# Patient Record
Sex: FEMALE | Race: WHITE
[De-identification: names, ages, dates, MRNs, and addresses within clinical notes are randomized per-mention and may not be internally consistent; named-entity substitution may affect disease eponyms.]

---

## 2018-02-18 PROBLEM — Z00.00 ENCOUNTER FOR PREVENTIVE HEALTH EXAMINATION: Status: ACTIVE | Noted: 2018-02-18

## 2018-02-20 ENCOUNTER — APPOINTMENT (OUTPATIENT)
Dept: CARDIOLOGY | Facility: CLINIC | Age: 73
End: 2018-02-20
Payer: MEDICARE

## 2018-02-20 ENCOUNTER — NON-APPOINTMENT (OUTPATIENT)
Age: 73
End: 2018-02-20

## 2018-02-20 VITALS
DIASTOLIC BLOOD PRESSURE: 70 MMHG | HEART RATE: 74 BPM | WEIGHT: 224 LBS | HEIGHT: 61 IN | BODY MASS INDEX: 42.29 KG/M2 | SYSTOLIC BLOOD PRESSURE: 130 MMHG

## 2018-02-20 DIAGNOSIS — Z80.9 FAMILY HISTORY OF MALIGNANT NEOPLASM, UNSPECIFIED: ICD-10-CM

## 2018-02-20 DIAGNOSIS — Z86.79 PERSONAL HISTORY OF OTHER DISEASES OF THE CIRCULATORY SYSTEM: ICD-10-CM

## 2018-02-20 DIAGNOSIS — Z87.898 PERSONAL HISTORY OF OTHER SPECIFIED CONDITIONS: ICD-10-CM

## 2018-02-20 DIAGNOSIS — Z82.5 FAMILY HISTORY OF ASTHMA AND OTHER CHRONIC LOWER RESPIRATORY DISEASES: ICD-10-CM

## 2018-02-20 DIAGNOSIS — Z82.49 FAMILY HISTORY OF ISCHEMIC HEART DISEASE AND OTHER DISEASES OF THE CIRCULATORY SYSTEM: ICD-10-CM

## 2018-02-20 DIAGNOSIS — Z84.89 FAMILY HISTORY OF OTHER SPECIFIED CONDITIONS: ICD-10-CM

## 2018-02-20 DIAGNOSIS — Z86.39 PERSONAL HISTORY OF OTHER ENDOCRINE, NUTRITIONAL AND METABOLIC DISEASE: ICD-10-CM

## 2018-02-20 PROCEDURE — 93000 ELECTROCARDIOGRAM COMPLETE: CPT

## 2018-02-20 PROCEDURE — 99204 OFFICE O/P NEW MOD 45 MIN: CPT

## 2018-02-20 RX ORDER — AMLODIPINE BESYLATE 5 MG/1
5 TABLET ORAL DAILY
Refills: 0 | Status: ACTIVE | COMMUNITY

## 2018-02-20 RX ORDER — METOPROLOL SUCCINATE 100 MG/1
100 TABLET, EXTENDED RELEASE ORAL DAILY
Refills: 0 | Status: ACTIVE | COMMUNITY

## 2018-02-20 RX ORDER — MULTIVITAMIN
TABLET ORAL
Refills: 0 | Status: ACTIVE | COMMUNITY

## 2018-02-20 RX ORDER — VALSARTAN AND HYDROCHLOROTHIAZIDE 160; 12.5 MG/1; MG/1
160-12.5 TABLET, FILM COATED ORAL DAILY
Refills: 0 | Status: ACTIVE | COMMUNITY

## 2018-02-21 ENCOUNTER — APPOINTMENT (OUTPATIENT)
Dept: CARDIOLOGY | Facility: CLINIC | Age: 73
End: 2018-02-21
Payer: MEDICARE

## 2018-02-21 PROCEDURE — 93306 TTE W/DOPPLER COMPLETE: CPT

## 2018-02-23 PROCEDURE — 93224 XTRNL ECG REC UP TO 48 HRS: CPT

## 2018-03-08 ENCOUNTER — APPOINTMENT (OUTPATIENT)
Dept: CARDIOLOGY | Facility: CLINIC | Age: 73
End: 2018-03-08
Payer: MEDICARE

## 2018-03-08 PROCEDURE — 93351 STRESS TTE COMPLETE: CPT

## 2018-03-12 ENCOUNTER — APPOINTMENT (OUTPATIENT)
Dept: CARDIOLOGY | Facility: CLINIC | Age: 73
End: 2018-03-12
Payer: MEDICARE

## 2018-03-12 VITALS
DIASTOLIC BLOOD PRESSURE: 62 MMHG | WEIGHT: 225 LBS | HEART RATE: 57 BPM | HEIGHT: 61 IN | SYSTOLIC BLOOD PRESSURE: 120 MMHG | BODY MASS INDEX: 42.48 KG/M2

## 2018-03-12 PROCEDURE — 99214 OFFICE O/P EST MOD 30 MIN: CPT

## 2018-04-04 ENCOUNTER — APPOINTMENT (OUTPATIENT)
Dept: CARDIOLOGY | Facility: CLINIC | Age: 73
End: 2018-04-04
Payer: MEDICARE

## 2018-04-04 PROCEDURE — A9502: CPT

## 2018-04-04 PROCEDURE — 93015 CV STRESS TEST SUPVJ I&R: CPT

## 2018-04-04 PROCEDURE — 78452 HT MUSCLE IMAGE SPECT MULT: CPT

## 2018-04-11 ENCOUNTER — RX RENEWAL (OUTPATIENT)
Age: 73
End: 2018-04-11

## 2018-04-17 ENCOUNTER — APPOINTMENT (OUTPATIENT)
Dept: CARDIOLOGY | Facility: CLINIC | Age: 73
End: 2018-04-17
Payer: MEDICARE

## 2018-04-17 VITALS
WEIGHT: 224 LBS | SYSTOLIC BLOOD PRESSURE: 118 MMHG | OXYGEN SATURATION: 98 % | HEART RATE: 54 BPM | DIASTOLIC BLOOD PRESSURE: 60 MMHG | HEIGHT: 61 IN | BODY MASS INDEX: 42.29 KG/M2 | RESPIRATION RATE: 16 BRPM

## 2018-04-17 PROCEDURE — 99214 OFFICE O/P EST MOD 30 MIN: CPT

## 2018-10-12 ENCOUNTER — APPOINTMENT (OUTPATIENT)
Dept: CARDIOLOGY | Facility: CLINIC | Age: 73
End: 2018-10-12
Payer: MEDICARE

## 2018-10-12 VITALS — OXYGEN SATURATION: 100 %

## 2018-10-12 VITALS
WEIGHT: 226 LBS | SYSTOLIC BLOOD PRESSURE: 122 MMHG | DIASTOLIC BLOOD PRESSURE: 64 MMHG | HEART RATE: 64 BPM | BODY MASS INDEX: 41.59 KG/M2 | HEIGHT: 62 IN

## 2018-10-12 PROCEDURE — 99214 OFFICE O/P EST MOD 30 MIN: CPT

## 2019-04-09 ENCOUNTER — APPOINTMENT (OUTPATIENT)
Dept: CARDIOLOGY | Facility: CLINIC | Age: 74
End: 2019-04-09
Payer: MEDICARE

## 2019-04-09 PROCEDURE — 93880 EXTRACRANIAL BILAT STUDY: CPT

## 2019-04-16 ENCOUNTER — APPOINTMENT (OUTPATIENT)
Dept: CARDIOLOGY | Facility: CLINIC | Age: 74
End: 2019-04-16
Payer: MEDICARE

## 2019-04-16 ENCOUNTER — NON-APPOINTMENT (OUTPATIENT)
Age: 74
End: 2019-04-16

## 2019-04-16 VITALS
DIASTOLIC BLOOD PRESSURE: 64 MMHG | HEIGHT: 60 IN | HEART RATE: 64 BPM | WEIGHT: 210 LBS | BODY MASS INDEX: 41.23 KG/M2 | SYSTOLIC BLOOD PRESSURE: 112 MMHG

## 2019-04-16 PROCEDURE — 93000 ELECTROCARDIOGRAM COMPLETE: CPT

## 2019-04-16 PROCEDURE — 99214 OFFICE O/P EST MOD 30 MIN: CPT

## 2019-04-16 RX ORDER — ASPIRIN 325 MG/1
325 TABLET, FILM COATED ORAL
Refills: 0 | Status: DISCONTINUED | COMMUNITY
End: 2019-04-16

## 2019-04-16 RX ORDER — SULFAMETHOXAZOLE AND TRIMETHOPRIM 800; 160 MG/1; MG/1
800-160 TABLET ORAL
Qty: 10 | Refills: 0 | Status: DISCONTINUED | COMMUNITY
Start: 2019-02-22 | End: 2019-04-16

## 2019-04-16 RX ORDER — ASPIRIN ENTERIC COATED TABLETS 81 MG 81 MG/1
81 TABLET, DELAYED RELEASE ORAL
Refills: 0 | Status: ACTIVE | COMMUNITY
Start: 2019-04-16

## 2019-04-16 NOTE — REVIEW OF SYSTEMS
[see HPI] : see HPI [Negative] : Heme/Lymph [Shortness Of Breath] : no shortness of breath [Chest  Pressure] : no chest pressure [Dyspnea on exertion] : not dyspnea during exertion [Chest Pain] : no chest pain [Lower Ext Edema] : no extremity edema [Leg Claudication] : no intermittent leg claudication [Palpitations] : no palpitations [Dizziness] : no dizziness

## 2019-04-16 NOTE — PHYSICAL EXAM
[Normal Appearance] : normal appearance [Well Groomed] : well groomed [No Deformities] : no deformities [Auscultation Breath Sounds / Voice Sounds] : lungs were clear to auscultation bilaterally [Respiration, Rhythm And Depth] : normal respiratory rhythm and effort [Heart Rate And Rhythm] : heart rate and rhythm were normal [Heart Sounds] : normal S1 and S2 [Arterial Pulses Normal] : the arterial pulses were normal [Edema] : no peripheral edema present [Bowel Sounds] : normal bowel sounds [Abdomen Soft] : soft [Abdomen Tenderness] : non-tender [Abnormal Walk] : normal gait [Gait - Sufficient For Exercise Testing] : the gait was sufficient for exercise testing [Cyanosis, Localized] : no localized cyanosis [Skin Color & Pigmentation] : normal skin color and pigmentation [Skin Turgor] : normal skin turgor [] : no rash [Oriented To Time, Place, And Person] : oriented to person, place, and time [Impaired Insight] : insight and judgment were intact [Affect] : the affect was normal [Mood] : the mood was normal [No Anxiety] : not feeling anxious [FreeTextEntry1] : soft nestor

## 2019-08-14 ENCOUNTER — MEDICATION RENEWAL (OUTPATIENT)
Age: 74
End: 2019-08-14

## 2019-10-31 ENCOUNTER — APPOINTMENT (OUTPATIENT)
Dept: CARDIOLOGY | Facility: CLINIC | Age: 74
End: 2019-10-31
Payer: MEDICARE

## 2019-10-31 VITALS
SYSTOLIC BLOOD PRESSURE: 132 MMHG | BODY MASS INDEX: 42.21 KG/M2 | WEIGHT: 215 LBS | HEIGHT: 60 IN | HEART RATE: 62 BPM | OXYGEN SATURATION: 97 % | DIASTOLIC BLOOD PRESSURE: 66 MMHG

## 2019-10-31 PROCEDURE — 99214 OFFICE O/P EST MOD 30 MIN: CPT

## 2019-11-01 NOTE — PHYSICAL EXAM
[Normal Appearance] : normal appearance [Well Groomed] : well groomed [No Deformities] : no deformities [] : no respiratory distress [Respiration, Rhythm And Depth] : normal respiratory rhythm and effort [Auscultation Breath Sounds / Voice Sounds] : lungs were clear to auscultation bilaterally [Heart Rate And Rhythm] : heart rate and rhythm were normal [Heart Sounds] : normal S1 and S2 [Arterial Pulses Normal] : the arterial pulses were normal [Edema] : no peripheral edema present [Bowel Sounds] : normal bowel sounds [Abdomen Soft] : soft [Abdomen Tenderness] : non-tender [Abnormal Walk] : normal gait [Gait - Sufficient For Exercise Testing] : the gait was sufficient for exercise testing [Cyanosis, Localized] : no localized cyanosis [Skin Color & Pigmentation] : normal skin color and pigmentation [Oriented To Time, Place, And Person] : oriented to person, place, and time [Impaired Insight] : insight and judgment were intact [Affect] : the affect was normal [Mood] : the mood was normal [No Anxiety] : not feeling anxious [Normal Conjunctiva] : the conjunctiva exhibited no abnormalities [FreeTextEntry1] : no carotid bruit, no JVD

## 2019-11-01 NOTE — ASSESSMENT
[FreeTextEntry1] :  Assessment and Plan:\par Huma Aquino is a 72 year old female with above history seen today Oct 31, 2019 with the following active issues. \par \par -CAD risk factors age, HTN, dyslipidemia, strong family h/o CAD, overweight and abnormal ekg. recent Echocardiogram demonstrates normal resting cardiac structure and function. Recent Stress echocardiogram was nondiagnostic due to submaximal heart rate.  \par Patient is asymptomatic from an arrhythmia standpoint.\par \par -irregular heart beat for years. Avoid stimulants and OTC decongestants. Holter monitor reviewed. No sustained arrhythmias. If symptoms increase in frequency, duration or intensity consider event monitor. \par \par - Hyperlipidemia, Tolerating Crestor 5 mg. Labs as above. Patient should follow a low trans sat fat diet. \par Suffering for myalgias. She will have a drug holiday for the next 3 weeks and call me to report how she is feeling. \par \par -overweight. Diet, weight loss should be followed. \par \par Red flag symptoms which would warrant sooner emergent evaluation reviewed with the patient. \par Questions and concerns were addressed and answered.\par \par Sincerely,\par \par ANTHONY Jasso \par \par

## 2019-11-01 NOTE — REVIEW OF SYSTEMS
[see HPI] : see HPI [Negative] : Heme/Lymph [Shortness Of Breath] : no shortness of breath [Dyspnea on exertion] : not dyspnea during exertion [Chest  Pressure] : no chest pressure [Chest Pain] : no chest pain [Lower Ext Edema] : no extremity edema [Leg Claudication] : no intermittent leg claudication [Palpitations] : no palpitations [Dizziness] : no dizziness

## 2019-11-01 NOTE — REASON FOR VISIT
[Follow-Up - Clinic] : a clinic follow-up of [Abnormal ECG] : an abnormal ECG [Hyperlipidemia] : hyperlipidemia [Medication Management] : Medication management [Palpitations] : palpitations [FreeTextEntry1] : Mrs. Aquino is a 72 year old female that presents today Oct. 31, 2019. Patient was last seen in our office on Apr. 16th, 2019. Over the winter she suffered from shingles, influenza and a GI infection. \par She has remained asymptomatic from a cardiovascular standpoint. \par Denies change in her current medication regimen.  Improvement in palpitations and irregularity in her heart beat. 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 \par Denies heart racing, palpitations, or skipped beats.\par Denies unusual shortness of breath, orthopnea, or Dyspnea on mild exertion\par Denies weight gain or lower leg edema. \par Denies palpitations, lightheadedness, dizziness, disorientation,  syncope.  \par Denies any unusual bleeding or black/tarry stool.\par \par Lipid panel reviewed: , TG 88, LDL 44, HLD 46, \par \par \par Cardiovascular history is significant for hypertension, overweight, abnormal ekg, age and family h/o CAD (multiple family members). \par \par She denies any h/o MI, CAD, CHF, CVA, TIA or arrhythmia. \par \par Carotid duplex 4/9/19 non-obstructive disease\par \par Abd CT 10/19 no aneurysm of the abdominal aorta\par Myocardial perfusion imaging 4/4/18 performed with Lexiscan, negative for ischemia or infarct\par \par \par \par Echocardiogram February 21, 2018, ejection fraction 60%, mild mitral regurgitation, no segmental wall motion abnormalities.\par \par Stress echocardiogram March 8, 2018 patient completed 4 minutes 30 seconds of the protocol.  Submaximal rate noted.  Peak blood pressure 130/60. achieved 71% of MPHR\par \par \par

## 2019-11-04 ENCOUNTER — TRANSCRIPTION ENCOUNTER (OUTPATIENT)
Age: 74
End: 2019-11-04

## 2020-08-08 ENCOUNTER — RX RENEWAL (OUTPATIENT)
Age: 75
End: 2020-08-08

## 2020-08-10 ENCOUNTER — NON-APPOINTMENT (OUTPATIENT)
Age: 75
End: 2020-08-10

## 2020-08-10 ENCOUNTER — RX RENEWAL (OUTPATIENT)
Age: 75
End: 2020-08-10

## 2020-08-10 ENCOUNTER — APPOINTMENT (OUTPATIENT)
Dept: CARDIOLOGY | Facility: CLINIC | Age: 75
End: 2020-08-10
Payer: MEDICARE

## 2020-08-10 VITALS
HEART RATE: 67 BPM | TEMPERATURE: 96.8 F | DIASTOLIC BLOOD PRESSURE: 62 MMHG | OXYGEN SATURATION: 99 % | HEIGHT: 60 IN | BODY MASS INDEX: 43 KG/M2 | SYSTOLIC BLOOD PRESSURE: 120 MMHG | WEIGHT: 219 LBS

## 2020-08-10 PROCEDURE — 93000 ELECTROCARDIOGRAM COMPLETE: CPT

## 2020-08-10 NOTE — PHYSICAL EXAM
[Normal Appearance] : normal appearance [Well Groomed] : well groomed [No Deformities] : no deformities [Respiration, Rhythm And Depth] : normal respiratory rhythm and effort [Auscultation Breath Sounds / Voice Sounds] : lungs were clear to auscultation bilaterally [Heart Rate And Rhythm] : heart rate and rhythm were normal [Heart Sounds] : normal S1 and S2 [Arterial Pulses Normal] : the arterial pulses were normal [FreeTextEntry1] : soft nestor [Edema] : no peripheral edema present [Bowel Sounds] : normal bowel sounds [Abdomen Soft] : soft [Abnormal Walk] : normal gait [Abdomen Tenderness] : non-tender [Cyanosis, Localized] : no localized cyanosis [Gait - Sufficient For Exercise Testing] : the gait was sufficient for exercise testing [Skin Turgor] : normal skin turgor [Skin Color & Pigmentation] : normal skin color and pigmentation [Oriented To Time, Place, And Person] : oriented to person, place, and time [] : no rash [Impaired Insight] : insight and judgment were intact [Affect] : the affect was normal [Mood] : the mood was normal [No Anxiety] : not feeling anxious

## 2020-08-10 NOTE — ASSESSMENT
[FreeTextEntry1] : COREY DON  is a 74 year F  who presents today Aug 10, 2020 with the above history and the following active issues. \par \par -CAD risk factors age, HTN, dyslipidemia, strong family h/o CAD, overweight and abnormal ekg. Echocardiogram demonstrates normal resting cardiac structure and function.  Stress echocardiogram was non-diagnostic due to submaximal heart rate.  Myocardial perfusion imaging negative for ischemia or infarct. \par Limitations of non-invasive testing reviewed. \par Patient is asymptomatic from an arrhythmia standpoint.\par \par -irregular heart beat for years. Avoid stimulants. Holter monitor reviewed. No sustained arrhythmias. If symptoms increase in frequency, duration or intensity consider event monitor. Labs as above.\par \par - Hyperlipidemia, tolerating Crestor 5 mg. Patient should follow a low trans sat fat diet. \par Excellent cholesterol panel. Lifestyle and risk factor modification.\par \par -overweight. Diet, weight loss should be followed. \par \par Red flag symptoms which would warrant sooner emergent evaluation reviewed with the patient. \par Questions and concerns were addressed and answered.\par \par Sincerely,\par \par Amy Blount PA-C\par Patients history, testing and plan reviewed with supervising MD: Dr. Yasir Arreaga

## 2020-08-10 NOTE — REASON FOR VISIT
[FreeTextEntry1] : COREY DON  is a 74 year F  who presents today Aug 10, 2020 in clinical follow-up. \par Overall she is feeling well. No recent illness or hospital stay. Less active during the pandemic with the gym being closed. \par She has remained asymptomatic from a cardiovascular standpoint. \par Denies change in her current medication regimen. \par \par Today she denies chest pain, pressure, unusual shortness of breath, lightheadedness, dizziness, near syncope or syncope.\par \par Cardiovascular history is significant for hypertension, overweight, abnormal ekg, age and family h/o CAD (multiple family members). \par \par She denies any h/o MI, CAD, CHF, CVA, TIA or arrhythmia. \par \par Carotid duplex 4/9/19 non-obstructive disease\par \par Abd CT 10/19 no aneurysm of the abdominal aorta\par Myocardial perfusion imaging 4/4/18 performed with Lexiscan, negative for ischemia or infarct\par \par lab work 3/10/18 Alkaline phosphatase 65, ALT 9, AST 18, triglycerides 92, HDL 43, LDL 42, total cholesterol 103, CK 44.\par \par Echocardiogram February 21, 2018, ejection fraction 60%, mild mitral regurgitation, no segmental wall motion abnormalities.\par \par Stress echocardiogram March 8, 2018 patient completed 4 minutes 30 seconds of the protocol.  Submaximal rate noted.  Peak blood pressure 130/60. achieved 71% of MPHR\par \par 24hr Holter monitor - no sustained arrhythmias, rare PVC's, min HR 49bpm, max HR 85bpm, average HR 58bpm\par \par Labs:\par 11-2-17 wbc 7.1, h/h 13.0/39.54, plat 294, NA+ 142, K 3.8, bun/crea t22/0.85, AST 19, ALT 13, glucose 115, hgba1c 5.8, globulin 4.1, LDL 97, triglycerides 99, HDL 48, TSH 1.30

## 2020-08-10 NOTE — REVIEW OF SYSTEMS
[see HPI] : see HPI [Shortness Of Breath] : no shortness of breath [Dyspnea on exertion] : not dyspnea during exertion [Chest  Pressure] : no chest pressure [Chest Pain] : no chest pain [Lower Ext Edema] : no extremity edema [Leg Claudication] : no intermittent leg claudication [Palpitations] : no palpitations [Dizziness] : no dizziness [Negative] : Heme/Lymph

## 2020-11-09 ENCOUNTER — RX RENEWAL (OUTPATIENT)
Age: 75
End: 2020-11-09

## 2021-02-04 RX ORDER — ROSUVASTATIN CALCIUM 5 MG/1
5 TABLET, FILM COATED ORAL
Qty: 90 | Refills: 0 | Status: ACTIVE | COMMUNITY
Start: 2018-02-20 | End: 1900-01-01

## 2021-08-23 ENCOUNTER — NON-APPOINTMENT (OUTPATIENT)
Age: 76
End: 2021-08-23

## 2021-08-23 ENCOUNTER — APPOINTMENT (OUTPATIENT)
Dept: CARDIOLOGY | Facility: CLINIC | Age: 76
End: 2021-08-23
Payer: MEDICARE

## 2021-08-23 VITALS
HEIGHT: 60 IN | BODY MASS INDEX: 41.43 KG/M2 | HEART RATE: 66 BPM | TEMPERATURE: 97 F | OXYGEN SATURATION: 98 % | SYSTOLIC BLOOD PRESSURE: 126 MMHG | WEIGHT: 211 LBS | DIASTOLIC BLOOD PRESSURE: 60 MMHG

## 2021-08-23 PROCEDURE — 93000 ELECTROCARDIOGRAM COMPLETE: CPT

## 2021-08-23 PROCEDURE — 99214 OFFICE O/P EST MOD 30 MIN: CPT

## 2021-08-23 NOTE — PHYSICAL EXAM
[Well Developed] : well developed [Well Nourished] : well nourished [No Acute Distress] : no acute distress [Normal Conjunctiva] : normal conjunctiva [Normal Venous Pressure] : normal venous pressure [No Carotid Bruit] : no carotid bruit [Normal S1, S2] : normal S1, S2 [No Murmur] : no murmur [No Rub] : no rub [No Gallop] : no gallop [Clear Lung Fields] : clear lung fields [Good Air Entry] : good air entry [No Respiratory Distress] : no respiratory distress  [Soft] : abdomen soft [Non Tender] : non-tender [No Masses/organomegaly] : no masses/organomegaly [Normal Bowel Sounds] : normal bowel sounds [Normal Gait] : normal gait [No Edema] : no edema [No Cyanosis] : no cyanosis [No Clubbing] : no clubbing [No Varicosities] : no varicosities [No Rash] : no rash [No Skin Lesions] : no skin lesions [Moves all extremities] : moves all extremities [No Focal Deficits] : no focal deficits [Normal Speech] : normal speech [Normal memory] : normal memory [Alert and Oriented] : alert and oriented [Normal Appearance] : normal appearance [Well Groomed] : well groomed [No Deformities] : no deformities [Respiration, Rhythm And Depth] : normal respiratory rhythm and effort [Auscultation Breath Sounds / Voice Sounds] : lungs were clear to auscultation bilaterally [Heart Rate And Rhythm] : heart rate and rhythm were normal [Heart Sounds] : normal S1 and S2 [Arterial Pulses Normal] : the arterial pulses were normal [Edema] : no peripheral edema present [Bowel Sounds] : normal bowel sounds [Abdomen Soft] : soft [Abnormal Walk] : normal gait [Abdomen Tenderness] : non-tender [Gait - Sufficient For Exercise Testing] : the gait was sufficient for exercise testing [Cyanosis, Localized] : no localized cyanosis [Skin Color & Pigmentation] : normal skin color and pigmentation [Skin Turgor] : normal skin turgor [] : no rash [Impaired Insight] : insight and judgment were intact [Oriented To Time, Place, And Person] : oriented to person, place, and time [Affect] : the affect was normal [Mood] : the mood was normal [No Anxiety] : not feeling anxious [FreeTextEntry1] : soft nestor

## 2021-08-23 NOTE — REASON FOR VISIT
[Symptom and Test Evaluation] : symptom and test evaluation [FreeTextEntry1] : COREY DON  is a 74 year F  who presents today  in clinical follow-up. \par Overall she is feeling well. No recent illness or hospital stay. Less active during the pandemic with the gym being closed. \par She has remained asymptomatic from a cardiovascular standpoint. \par Denies change in her current medication regimen. \par \par Today she denies chest pain, pressure, unusual shortness of breath, lightheadedness, dizziness, near syncope or syncope.\par \par Cardiovascular history is significant for hypertension, overweight, abnormal ekg, age and family h/o CAD (multiple family members). \par \par She denies any h/o MI, CAD, CHF, CVA, TIA or arrhythmia. \par \par Carotid duplex 4/9/19 non-obstructive disease\par \par Abd CT 10/19 no aneurysm of the abdominal aorta\par Myocardial perfusion imaging 4/4/18 performed with Lexiscan, negative for ischemia or infarct\par \par lab work 3/10/18 Alkaline phosphatase 65, ALT 9, AST 18, triglycerides 92, HDL 43, LDL 42, total cholesterol 103, CK 44.\par \par Echocardiogram February 21, 2018, ejection fraction 60%, mild mitral regurgitation, no segmental wall motion abnormalities.\par \par Stress echocardiogram March 8, 2018 patient completed 4 minutes 30 seconds of the protocol.  Submaximal rate noted.  Peak blood pressure 130/60. achieved 71% of MPHR\par \par 24hr Holter monitor - no sustained arrhythmias, rare PVC's, min HR 49bpm, max HR 85bpm, average HR 58bpm\par \par Labs:\par 11-2-17 wbc 7.1, h/h 13.0/39.54, plat 294, NA+ 142, K 3.8, bun/crea t22/0.85, AST 19, ALT 13, glucose 115, hgba1c 5.8, globulin 4.1, LDL 97, triglycerides 99, HDL 48, TSH 1.30

## 2021-08-23 NOTE — PHYSICAL EXAM
[Well Developed] : well developed [Well Nourished] : well nourished [No Acute Distress] : no acute distress [Normal Conjunctiva] : normal conjunctiva [Normal Venous Pressure] : normal venous pressure [No Carotid Bruit] : no carotid bruit [Normal S1, S2] : normal S1, S2 [No Murmur] : no murmur [No Rub] : no rub [No Gallop] : no gallop [Clear Lung Fields] : clear lung fields [Good Air Entry] : good air entry [No Respiratory Distress] : no respiratory distress  [Soft] : abdomen soft [Non Tender] : non-tender [No Masses/organomegaly] : no masses/organomegaly [Normal Bowel Sounds] : normal bowel sounds [Normal Gait] : normal gait [No Edema] : no edema [No Cyanosis] : no cyanosis [No Clubbing] : no clubbing [No Varicosities] : no varicosities [No Rash] : no rash [No Skin Lesions] : no skin lesions [Moves all extremities] : moves all extremities [No Focal Deficits] : no focal deficits [Normal Speech] : normal speech [Normal memory] : normal memory [Alert and Oriented] : alert and oriented [Normal Appearance] : normal appearance [Well Groomed] : well groomed [No Deformities] : no deformities [Respiration, Rhythm And Depth] : normal respiratory rhythm and effort [Auscultation Breath Sounds / Voice Sounds] : lungs were clear to auscultation bilaterally [Heart Rate And Rhythm] : heart rate and rhythm were normal [Heart Sounds] : normal S1 and S2 [Arterial Pulses Normal] : the arterial pulses were normal [Edema] : no peripheral edema present [Bowel Sounds] : normal bowel sounds [Abdomen Soft] : soft [Abdomen Tenderness] : non-tender [Abnormal Walk] : normal gait [Gait - Sufficient For Exercise Testing] : the gait was sufficient for exercise testing [Cyanosis, Localized] : no localized cyanosis [Skin Color & Pigmentation] : normal skin color and pigmentation [Skin Turgor] : normal skin turgor [] : no rash [Oriented To Time, Place, And Person] : oriented to person, place, and time [Impaired Insight] : insight and judgment were intact [Affect] : the affect was normal [Mood] : the mood was normal [No Anxiety] : not feeling anxious [FreeTextEntry1] : soft nestor

## 2021-08-23 NOTE — ASSESSMENT
[FreeTextEntry1] : COREY DON  is a 74 year F  who presents today with the above history and the following active issues. \par \par -CAD risk factors age, HTN, dyslipidemia, strong family h/o CAD, overweight and abnormal ekg. Echocardiogram demonstrates normal resting cardiac structure and function.  Stress echocardiogram was non-diagnostic due to submaximal heart rate.  Myocardial perfusion imaging negative for ischemia or infarct. \par Limitations of non-invasive testing reviewed. \par Patient is asymptomatic from an arrhythmia standpoint.\par \par -irregular heart beat for years. Avoid stimulants. Holter monitor reviewed. No sustained arrhythmias. If symptoms increase in frequency, duration or intensity consider event monitor. Labs as above.\par \par - Hyperlipidemia, tolerating Crestor 5 mg. Patient should follow a low trans sat fat diet. \par Excellent cholesterol panel. Lifestyle and risk factor modification.\par \par -overweight. Diet, weight loss should be followed. \par \par Red flag symptoms which would warrant sooner emergent evaluation reviewed with the patient. \par Questions and concerns were addressed and answered.\par \par Sincerely,\par \par Amy Blount PA-C\par Patients history, testing and plan reviewed with supervising MD: Dr. Yasir Arreaga

## 2022-08-23 ENCOUNTER — NON-APPOINTMENT (OUTPATIENT)
Age: 77
End: 2022-08-23

## 2022-08-23 ENCOUNTER — APPOINTMENT (OUTPATIENT)
Dept: CARDIOLOGY | Facility: CLINIC | Age: 77
End: 2022-08-23

## 2022-08-23 VITALS
WEIGHT: 200 LBS | DIASTOLIC BLOOD PRESSURE: 62 MMHG | HEIGHT: 60 IN | SYSTOLIC BLOOD PRESSURE: 136 MMHG | BODY MASS INDEX: 39.27 KG/M2 | TEMPERATURE: 97.3 F | HEART RATE: 67 BPM | OXYGEN SATURATION: 98 %

## 2022-08-23 PROCEDURE — 93000 ELECTROCARDIOGRAM COMPLETE: CPT

## 2022-08-23 PROCEDURE — 99214 OFFICE O/P EST MOD 30 MIN: CPT

## 2022-08-23 RX ORDER — ANASTROZOLE 1 MG/1
1 TABLET ORAL
Refills: 0 | Status: DISCONTINUED | COMMUNITY
End: 2022-08-23

## 2022-08-23 NOTE — REASON FOR VISIT
[FreeTextEntry1] : COREY DON  is a 74 year F  who presents today in clinical follow-up. \par Overall she is feeling well. No recent illness or hospital stay. Less active during the pandemic with the gym being closed. \par She has remained asymptomatic from a cardiovascular standpoint. \par Denies change in her current medication regimen. \par \par Today she denies chest pain, pressure, unusual shortness of breath, lightheadedness, dizziness, near syncope or syncope.\par \par Cardiovascular history is significant for hypertension, overweight, abnormal ekg, age and family h/o CAD (multiple family members). \par \par She denies any h/o MI, CAD, CHF, CVA, TIA or arrhythmia. \par \par Carotid duplex 4/9/19 non-obstructive disease\par \par Abd CT 10/19 no aneurysm of the abdominal aorta\par Myocardial perfusion imaging 4/4/18 performed with Lexiscan, negative for ischemia or infarct\par \par lab work 3/10/18 Alkaline phosphatase 65, ALT 9, AST 18, triglycerides 92, HDL 43, LDL 42, total cholesterol 103, CK 44.\par \par Echocardiogram February 21, 2018, ejection fraction 60%, mild mitral regurgitation, no segmental wall motion abnormalities.\par \par Stress echocardiogram March 8, 2018 patient completed 4 minutes 30 seconds of the protocol.  Submaximal rate noted.  Peak blood pressure 130/60. achieved 71% of MPHR\par \par 24hr Holter monitor - no sustained arrhythmias, rare PVC's, min HR 49bpm, max HR 85bpm, average HR 58bpm\par \par Labs:\par 11-2-17 wbc 7.1, h/h 13.0/39.54, plat 294, NA+ 142, K 3.8, bun/crea t22/0.85, AST 19, ALT 13, glucose 115, hgba1c 5.8, globulin 4.1, LDL 97, triglycerides 99, HDL 48, TSH 1.30

## 2022-08-23 NOTE — PHYSICAL EXAM
[Normal Appearance] : normal appearance [Well Groomed] : well groomed [No Deformities] : no deformities [Respiration, Rhythm And Depth] : normal respiratory rhythm and effort [Auscultation Breath Sounds / Voice Sounds] : lungs were clear to auscultation bilaterally [Heart Rate And Rhythm] : heart rate and rhythm were normal [Heart Sounds] : normal S1 and S2 [Arterial Pulses Normal] : the arterial pulses were normal [Edema] : no peripheral edema present [Bowel Sounds] : normal bowel sounds [Abdomen Soft] : soft [Abdomen Tenderness] : non-tender [Abnormal Walk] : normal gait [Gait - Sufficient For Exercise Testing] : the gait was sufficient for exercise testing [Cyanosis, Localized] : no localized cyanosis [Skin Color & Pigmentation] : normal skin color and pigmentation [Skin Turgor] : normal skin turgor [] : no rash [Oriented To Time, Place, And Person] : oriented to person, place, and time [Impaired Insight] : insight and judgment were intact [Affect] : the affect was normal [Mood] : the mood was normal [No Anxiety] : not feeling anxious [FreeTextEntry1] : soft nestor

## 2022-08-23 NOTE — ASSESSMENT
[FreeTextEntry1] : COREY DON  is a 76 year F  who presents today  with the above history and the following active issues. \par \par -CAD risk factors age, HTN, dyslipidemia, strong family h/o CAD, overweight and abnormal ekg. Echocardiogram demonstrates normal resting cardiac structure and function.  Stress echocardiogram was non-diagnostic due to submaximal heart rate.  Myocardial perfusion imaging negative for ischemia or infarct. \par Limitations of non-invasive testing reviewed. \par Patient is asymptomatic from an arrhythmia standpoint.\par \par -irregular heart beat for years. Avoid stimulants. Holter monitor reviewed. No sustained arrhythmias. If symptoms increase in frequency, duration or intensity consider event monitor. Labs as above.\par \par - Hyperlipidemia, tolerating Crestor 5 mg. Patient should follow a low trans sat fat diet. \par Excellent cholesterol panel. Lifestyle and risk factor modification.\par \par abnormal ECG, repeat echo this year\par Carotid atherosclerosis, repeat carotid US this year\par follow up after testing.\par \par

## 2022-09-02 ENCOUNTER — APPOINTMENT (OUTPATIENT)
Dept: CARDIOLOGY | Facility: CLINIC | Age: 77
End: 2022-09-02

## 2022-09-02 VITALS
SYSTOLIC BLOOD PRESSURE: 130 MMHG | TEMPERATURE: 97.1 F | BODY MASS INDEX: 39.27 KG/M2 | HEART RATE: 57 BPM | OXYGEN SATURATION: 100 % | DIASTOLIC BLOOD PRESSURE: 62 MMHG | HEIGHT: 60 IN | WEIGHT: 200 LBS

## 2022-09-02 DIAGNOSIS — E66.3 OVERWEIGHT: ICD-10-CM

## 2022-09-02 DIAGNOSIS — E78.5 HYPERLIPIDEMIA, UNSPECIFIED: ICD-10-CM

## 2022-09-02 PROCEDURE — 99214 OFFICE O/P EST MOD 30 MIN: CPT

## 2022-09-02 PROCEDURE — 93306 TTE W/DOPPLER COMPLETE: CPT

## 2022-09-02 PROCEDURE — 93880 EXTRACRANIAL BILAT STUDY: CPT

## 2022-09-02 NOTE — REASON FOR VISIT
[FreeTextEntry1] : COREY DON  is a 76 year F  who presents today in clinical follow-up. \par Overall she is feeling well. No recent illness or hospital stay. Less active during the pandemic with the gym being closed. \par She has remained asymptomatic from a cardiovascular standpoint. \par Denies change in her current medication regimen. \par \par Today she denies chest pain, pressure, unusual shortness of breath, lightheadedness, dizziness, near syncope or syncope.\par \par Cardiovascular history is significant for hypertension, overweight, abnormal ekg, age and family h/o CAD (multiple family members). \par \par She denies any h/o MI, CAD, CHF, CVA, TIA or arrhythmia. \par \par Carotid duplex 4/9/19 non-obstructive disease\par \par Abd CT 10/19 no aneurysm of the abdominal aorta\par Myocardial perfusion imaging 4/4/18 performed with Lexiscan, negative for ischemia or infarct\par \par lab work 3/10/18 Alkaline phosphatase 65, ALT 9, AST 18, triglycerides 92, HDL 43, LDL 42, total cholesterol 103, CK 44.\par \par Echocardiogram February 21, 2018, ejection fraction 60%, mild mitral regurgitation, no segmental wall motion abnormalities.\par \par Stress echocardiogram March 8, 2018 patient completed 4 minutes 30 seconds of the protocol.  Submaximal rate noted.  Peak blood pressure 130/60. achieved 71% of MPHR\par \par 24hr Holter monitor - no sustained arrhythmias, rare PVC's, min HR 49bpm, max HR 85bpm, average HR 58bpm\par \par Labs:\par 11-2-17 wbc 7.1, h/h 13.0/39.54, plat 294, NA+ 142, K 3.8, bun/crea t22/0.85, AST 19, ALT 13, glucose 115, hgba1c 5.8, globulin 4.1, LDL 97, triglycerides 99, HDL 48, TSH 1.30

## 2022-09-02 NOTE — PHYSICAL EXAM
[Normal] : no edema, no cyanosis, no clubbing, no varicosities [Normal Appearance] : normal appearance [Well Groomed] : well groomed [No Deformities] : no deformities [Respiration, Rhythm And Depth] : normal respiratory rhythm and effort [Auscultation Breath Sounds / Voice Sounds] : lungs were clear to auscultation bilaterally [Heart Rate And Rhythm] : heart rate and rhythm were normal [Heart Sounds] : normal S1 and S2 [Arterial Pulses Normal] : the arterial pulses were normal [Edema] : no peripheral edema present [Bowel Sounds] : normal bowel sounds [Abdomen Soft] : soft [Abdomen Tenderness] : non-tender [Abnormal Walk] : normal gait [Gait - Sufficient For Exercise Testing] : the gait was sufficient for exercise testing [Cyanosis, Localized] : no localized cyanosis [Skin Color & Pigmentation] : normal skin color and pigmentation [Skin Turgor] : normal skin turgor [] : no rash [Oriented To Time, Place, And Person] : oriented to person, place, and time [Impaired Insight] : insight and judgment were intact [Affect] : the affect was normal [Mood] : the mood was normal [No Anxiety] : not feeling anxious [FreeTextEntry1] : soft nestor

## 2022-09-02 NOTE — ASSESSMENT
[FreeTextEntry1] : COREY DON  is a 76 year F  who presents today  with the above history and the following active issues. \par \par -CAD risk factors age, HTN, dyslipidemia, strong family h/o CAD, overweight and abnormal ekg. Echocardiogram demonstrates normal resting cardiac structure and function.  Stress echocardiogram was non-diagnostic due to submaximal heart rate.  Myocardial perfusion imaging negative for ischemia or infarct. \par Limitations of non-invasive testing reviewed. \par Patient is asymptomatic from an arrhythmia standpoint.\par \par -irregular heart beat for years. Avoid stimulants. Holter monitor reviewed. No sustained arrhythmias. If symptoms increase in frequency, duration or intensity consider event monitor. Labs as above.\par She reports no further palpitations.  She increased hydration.  She cut down on caffeine intake.\par \par - Hyperlipidemia, tolerating Crestor 5 mg. Patient should follow a low trans sat fat diet. \par Excellent cholesterol panel. Lifestyle and risk factor modification.\par \par abnormal ECG, repeat echo this year\par Carotid atherosclerosis, repeat carotid US this year\par follow up after testing.\par Cardiac testing was done today which included echocardiogram and carotid ultrasound.  Echocardiogram reveals normal ejection fraction.  No pericardial effusion.  No LVH.  Minimal enlargement of left atrium.  Carotid ultrasound reveals mild bilateral carotid atherosclerosis.  No significant changes.  Patient is on aspirin and statin.  Her lipid profile is excellent.  She is physically active.  She walks on a regular basis.  No symptoms of any chest pains or shortness of breath.  Continue primary prevention.\par She has chronic lymphedema on left lower extremity.  She follows in [facility] for treatment.  Otherwise she is very stable.\par Cardiac follow-up yearly and as needed.\par

## 2023-01-10 ENCOUNTER — APPOINTMENT (OUTPATIENT)
Dept: GASTROENTEROLOGY | Facility: CLINIC | Age: 78
End: 2023-01-10
Payer: MEDICARE

## 2023-01-10 VITALS
OXYGEN SATURATION: 98 % | DIASTOLIC BLOOD PRESSURE: 60 MMHG | WEIGHT: 197 LBS | SYSTOLIC BLOOD PRESSURE: 110 MMHG | BODY MASS INDEX: 38.68 KG/M2 | HEIGHT: 60 IN | HEART RATE: 71 BPM | TEMPERATURE: 97.7 F

## 2023-01-10 DIAGNOSIS — K44.9 DIAPHRAGMATIC HERNIA W/OUT OBSTRUCTION OR GANGRENE: ICD-10-CM

## 2023-01-10 DIAGNOSIS — Z20.822 ENCOUNTER FOR PREPROCEDURAL LABORATORY EXAMINATION: ICD-10-CM

## 2023-01-10 DIAGNOSIS — K57.90 DIVERTICULOSIS OF INTESTINE, PART UNSPECIFIED, W/OUT PERFORATION OR ABSCESS W/OUT BLEEDING: ICD-10-CM

## 2023-01-10 DIAGNOSIS — K21.9 DIAPHRAGMATIC HERNIA W/OUT OBSTRUCTION OR GANGRENE: ICD-10-CM

## 2023-01-10 DIAGNOSIS — Z78.9 OTHER SPECIFIED HEALTH STATUS: ICD-10-CM

## 2023-01-10 DIAGNOSIS — Z82.49 FAMILY HISTORY OF ISCHEMIC HEART DISEASE AND OTHER DISEASES OF THE CIRCULATORY SYSTEM: ICD-10-CM

## 2023-01-10 DIAGNOSIS — Z85.3 PERSONAL HISTORY OF MALIGNANT NEOPLASM OF BREAST: ICD-10-CM

## 2023-01-10 DIAGNOSIS — Z87.442 PERSONAL HISTORY OF URINARY CALCULI: ICD-10-CM

## 2023-01-10 DIAGNOSIS — R93.89 ABNORMAL FINDINGS ON DIAGNOSTIC IMAGING OF OTHER SPECIFIED BODY STRUCTURES: ICD-10-CM

## 2023-01-10 DIAGNOSIS — Z01.812 ENCOUNTER FOR PREPROCEDURAL LABORATORY EXAMINATION: ICD-10-CM

## 2023-01-10 DIAGNOSIS — R12 HEARTBURN: ICD-10-CM

## 2023-01-10 PROCEDURE — 99204 OFFICE O/P NEW MOD 45 MIN: CPT

## 2023-01-10 NOTE — ADDENDUM
[FreeTextEntry1] : Reviewed colonoscopy report from April 13, 2016.  Mild pancolonic diverticulosis otherwise normal.  10-year follow-up given.  Patient will be 80 at that time.  Recommend deferring further colorectal cancer screening

## 2023-01-10 NOTE — ASSESSMENT
[FreeTextEntry1] : Impression: Abnormal CT showing thickening of stomach wall.  Probably artifact.  Needs EGD to rule out pathology.  Average risk colorectal cancer.  We will review previous colonoscopy report to confirm recall date.\par \par Plan: Schedule EGD with biopsy.  If due for screening colonoscopy will add on same date.

## 2023-01-10 NOTE — HISTORY OF PRESENT ILLNESS
[FreeTextEntry1] : 77-year-old female seen 7 or 8 years ago for routine screening colonoscopy which was reportedly negative except for diverticulosis referred for evaluation of an abnormal CAT scan.  Patient has had 2 CAT scans this past year for kidney stones.  For CT mentions a hiatal hernia.  Second CTs from December 28 mentions mild proximal gastric wall thickening.  Patient has no symptoms other than occasional mild heartburn for which she takes Tums.  Has never had an upper endoscopy.  Personal history of breast cancer.  No family history of GI malignancy.  Was apparently given a 10-year follow-up for her colonoscopy but will need to confirm.  Patient is status postcholecystectomy

## 2023-01-11 LAB — SARS-COV-2 N GENE NPH QL NAA+PROBE: NOT DETECTED

## 2023-01-13 ENCOUNTER — RX RENEWAL (OUTPATIENT)
Age: 78
End: 2023-01-13

## 2023-01-13 ENCOUNTER — APPOINTMENT (OUTPATIENT)
Dept: GASTROENTEROLOGY | Facility: AMBULATORY MEDICAL SERVICES | Age: 78
End: 2023-01-13
Payer: MEDICARE

## 2023-01-13 DIAGNOSIS — K21.00 GASTRO-ESOPHAGEAL REFLUX DISEASE WITH ESOPHAGITIS, WITHOUT BLEEDING: ICD-10-CM

## 2023-01-13 PROCEDURE — 43239 EGD BIOPSY SINGLE/MULTIPLE: CPT

## 2023-01-26 ENCOUNTER — NON-APPOINTMENT (OUTPATIENT)
Age: 78
End: 2023-01-26

## 2023-04-29 ENCOUNTER — RX ONLY (RX ONLY)
Age: 78
End: 2023-04-29

## 2023-04-29 ENCOUNTER — OFFICE (OUTPATIENT)
Dept: URBAN - METROPOLITAN AREA CLINIC 38 | Facility: CLINIC | Age: 78
Setting detail: OPHTHALMOLOGY
End: 2023-04-29
Payer: MEDICARE

## 2023-04-29 DIAGNOSIS — H01.004: ICD-10-CM

## 2023-04-29 DIAGNOSIS — Z96.1: ICD-10-CM

## 2023-04-29 DIAGNOSIS — H01.001: ICD-10-CM

## 2023-04-29 DIAGNOSIS — H26.493: ICD-10-CM

## 2023-04-29 DIAGNOSIS — H43.393: ICD-10-CM

## 2023-04-29 DIAGNOSIS — H16.223: ICD-10-CM

## 2023-04-29 DIAGNOSIS — H35.033: ICD-10-CM

## 2023-04-29 PROCEDURE — 92250 FUNDUS PHOTOGRAPHY W/I&R: CPT | Performed by: OPTOMETRIST

## 2023-04-29 PROCEDURE — 92014 COMPRE OPH EXAM EST PT 1/>: CPT | Performed by: OPTOMETRIST

## 2023-04-29 ASSESSMENT — REFRACTION_CURRENTRX
OS_VPRISM_DIRECTION: SV
OS_OVR_VA: 20/
OD_OVR_VA: 20/
OD_VPRISM_DIRECTION: SV
OS_SPHERE: +1.50
OD_SPHERE: +1.50

## 2023-04-29 ASSESSMENT — KERATOMETRY
OD_K1POWER_DIOPTERS: 45.25
METHOD_AUTO_MANUAL: AUTO
OS_AXISANGLE_DEGREES: 090
OD_K2POWER_DIOPTERS: 45.25
OD_AXISANGLE_DEGREES: 090
OS_K2POWER_DIOPTERS: 45.50
OS_K1POWER_DIOPTERS: 45.50

## 2023-04-29 ASSESSMENT — REFRACTION_MANIFEST
OS_CYLINDER: -0.25
OD_SPHERE: +0.25
OS_ADD: +2.00
OU_VA: 20/20-
OS_VA2: 20/25
OD_VA1: 20/30-
OD_VA2: 20/25
OD_AXIS: 090
OD_ADD: +2.00
OS_AXIS: 090
OS_VA1: 20/20
OD_CYLINDER: -0.50
OS_SPHERE: PLANO

## 2023-04-29 ASSESSMENT — REFRACTION_AUTOREFRACTION
OD_SPHERE: +0.75
OS_AXIS: 101
OD_AXIS: 103
OD_CYLINDER: -1.25
OS_CYLINDER: -0.50
OS_SPHERE: +0.50

## 2023-04-29 ASSESSMENT — SUPERFICIAL PUNCTATE KERATITIS (SPK)
OD_SPK: 1+
OS_SPK: 1+

## 2023-04-29 ASSESSMENT — SPHEQUIV_DERIVED
OD_SPHEQUIV: 0
OS_SPHEQUIV: 0.25
OD_SPHEQUIV: 0.125

## 2023-04-29 ASSESSMENT — VISUAL ACUITY
OS_BCVA: 20/25+2
OD_BCVA: 20/20-

## 2023-04-29 ASSESSMENT — CONFRONTATIONAL VISUAL FIELD TEST (CVF)
OS_FINDINGS: FULL
OD_FINDINGS: FULL

## 2023-04-29 ASSESSMENT — LID EXAM ASSESSMENTS
OS_BLEPHARITIS: LUL T
OD_BLEPHARITIS: RUL T

## 2023-04-29 ASSESSMENT — TONOMETRY
OD_IOP_MMHG: 16
OS_IOP_MMHG: 20

## 2023-04-29 ASSESSMENT — AXIALLENGTH_DERIVED
OS_AL: 22.7882
OD_AL: 22.9201
OD_AL: 22.9662

## 2023-06-26 ENCOUNTER — OFFICE (OUTPATIENT)
Dept: URBAN - METROPOLITAN AREA CLINIC 38 | Facility: CLINIC | Age: 78
Setting detail: OPHTHALMOLOGY
End: 2023-06-26
Payer: MEDICARE

## 2023-06-26 DIAGNOSIS — H01.004: ICD-10-CM

## 2023-06-26 DIAGNOSIS — H01.001: ICD-10-CM

## 2023-06-26 DIAGNOSIS — H16.223: ICD-10-CM

## 2023-06-26 PROCEDURE — 99213 OFFICE O/P EST LOW 20 MIN: CPT

## 2023-06-26 ASSESSMENT — REFRACTION_MANIFEST
OS_VA2: 20/25
OD_VA2: 20/25
OD_ADD: +2.00
OU_VA: 20/20-
OD_VA1: 20/30-
OD_AXIS: 090
OD_CYLINDER: -0.50
OD_SPHERE: +0.25
OS_SPHERE: PLANO
OS_ADD: +2.00
OS_AXIS: 090
OS_VA1: 20/20
OS_CYLINDER: -0.25

## 2023-06-26 ASSESSMENT — REFRACTION_CURRENTRX
OS_VPRISM_DIRECTION: SV
OD_VPRISM_DIRECTION: SV
OD_SPHERE: +1.50
OS_OVR_VA: 20/
OD_OVR_VA: 20/
OS_SPHERE: +1.50

## 2023-06-26 ASSESSMENT — REFRACTION_AUTOREFRACTION
OD_AXIS: 103
OD_SPHERE: +0.75
OS_SPHERE: +0.50
OS_AXIS: 101
OD_CYLINDER: -1.25
OS_CYLINDER: -0.50

## 2023-06-26 ASSESSMENT — LID EXAM ASSESSMENTS
OD_BLEPHARITIS: RUL 1+
OD_MEIBOMITIS: RUL 1+ 2+
OS_BLEPHARITIS: LUL 1+
OS_MEIBOMITIS: LUL 1+ 2+

## 2023-06-26 ASSESSMENT — KERATOMETRY
OD_AXISANGLE_DEGREES: 090
METHOD_AUTO_MANUAL: AUTO
OS_K2POWER_DIOPTERS: 45.50
OD_K2POWER_DIOPTERS: 45.25
OS_AXISANGLE_DEGREES: 090
OS_K1POWER_DIOPTERS: 45.50
OD_K1POWER_DIOPTERS: 45.25

## 2023-06-26 ASSESSMENT — CONFRONTATIONAL VISUAL FIELD TEST (CVF)
OS_FINDINGS: FULL
OD_FINDINGS: FULL

## 2023-06-26 ASSESSMENT — TEAR BREAK UP TIME (TBUT)
OS_TBUT: 1 SEC
OD_TBUT: 1 SEC

## 2023-06-26 ASSESSMENT — AXIALLENGTH_DERIVED
OD_AL: 22.9201
OS_AL: 22.7882
OD_AL: 22.9662

## 2023-06-26 ASSESSMENT — SPHEQUIV_DERIVED
OS_SPHEQUIV: 0.25
OD_SPHEQUIV: 0.125
OD_SPHEQUIV: 0

## 2023-06-26 ASSESSMENT — VISUAL ACUITY
OS_BCVA: 20/25+2
OD_BCVA: 20/20-

## 2023-06-26 ASSESSMENT — TONOMETRY
OD_IOP_MMHG: 16
OS_IOP_MMHG: 16

## 2023-07-07 ENCOUNTER — NON-APPOINTMENT (OUTPATIENT)
Age: 78
End: 2023-07-07

## 2023-07-13 ENCOUNTER — TRANSCRIPTION ENCOUNTER (OUTPATIENT)
Age: 78
End: 2023-07-13

## 2023-09-06 ENCOUNTER — APPOINTMENT (OUTPATIENT)
Dept: CARDIOLOGY | Facility: CLINIC | Age: 78
End: 2023-09-06
Payer: MEDICARE

## 2023-09-06 VITALS
DIASTOLIC BLOOD PRESSURE: 60 MMHG | OXYGEN SATURATION: 99 % | WEIGHT: 210 LBS | HEART RATE: 66 BPM | BODY MASS INDEX: 41.01 KG/M2 | SYSTOLIC BLOOD PRESSURE: 112 MMHG

## 2023-09-06 DIAGNOSIS — E78.5 HYPERLIPIDEMIA, UNSPECIFIED: ICD-10-CM

## 2023-09-06 DIAGNOSIS — R94.31 ABNORMAL ELECTROCARDIOGRAM [ECG] [EKG]: ICD-10-CM

## 2023-09-06 DIAGNOSIS — I49.9 CARDIAC ARRHYTHMIA, UNSPECIFIED: ICD-10-CM

## 2023-09-06 PROCEDURE — 99214 OFFICE O/P EST MOD 30 MIN: CPT

## 2023-09-06 PROCEDURE — 93000 ELECTROCARDIOGRAM COMPLETE: CPT

## 2023-09-06 RX ORDER — BACILLUS COAGULANS/INULIN 21B-1 G
TABLET,CHEWABLE ORAL
Refills: 0 | Status: ACTIVE | COMMUNITY

## 2023-09-06 NOTE — REASON FOR VISIT
[FreeTextEntry1] : COREY DON  is a 77 year F  who presents today in clinical follow-up.  Overall she is feeling well. No recent illness or hospital stay. Less active during the pandemic with the gym being closed.  She has remained asymptomatic from a cardiovascular standpoint.  Denies change in her current medication regimen.   Today she denies chest pain, pressure, unusual shortness of breath, lightheadedness, dizziness, near syncope or syncope.  Cardiovascular history is significant for hypertension, overweight, abnormal ekg, age and family h/o CAD (multiple family members).   She denies any h/o MI, CAD, CHF, CVA, TIA or arrhythmia.   Carotid duplex 4/9/19 non-obstructive disease  Abd CT 10/19 no aneurysm of the abdominal aorta Myocardial perfusion imaging 4/4/18 performed with Lexiscan, negative for ischemia or infarct  lab work 3/10/18 Alkaline phosphatase 65, ALT 9, AST 18, triglycerides 92, HDL 43, LDL 42, total cholesterol 103, CK 44.  Echocardiogram February 21, 2018, ejection fraction 60%, mild mitral regurgitation, no segmental wall motion abnormalities.  Stress echocardiogram March 8, 2018 patient completed 4 minutes 30 seconds of the protocol.  Submaximal rate noted.  Peak blood pressure 130/60. achieved 71% of MPHR  24hr Holter monitor - no sustained arrhythmias, rare PVC's, min HR 49bpm, max HR 85bpm, average HR 58bpm  Labs: 11-2-17 wbc 7.1, h/h 13.0/39.54, plat 294, NA+ 142, K 3.8, bun/crea t22/0.85, AST 19, ALT 13, glucose 115, hgba1c 5.8, globulin 4.1, LDL 97, triglycerides 99, HDL 48, TSH 1.30

## 2023-09-06 NOTE — ASSESSMENT
[FreeTextEntry1] : COREY DON  is a 77 year F  who presents today  with the above history and the following active issues.   -CAD risk factors age, HTN, dyslipidemia, strong family h/o CAD, overweight and abnormal ekg. Echocardiogram demonstrates normal resting cardiac structure and function.  Stress echocardiogram was non-diagnostic due to submaximal heart rate.  Myocardial perfusion imaging negative for ischemia or infarct.  Limitations of non-invasive testing reviewed.  Patient is asymptomatic from an arrhythmia standpoint.  -irregular heart beat for years. Avoid stimulants. Holter monitor reviewed. No sustained arrhythmias. If symptoms increase in frequency, duration or intensity consider event monitor. Labs as above. She reports no further palpitations.  She increased hydration.  She cut down on caffeine intake.  - Hyperlipidemia, tolerating Crestor 5 mg. Patient should follow a low trans sat fat diet.  Excellent cholesterol panel. Lifestyle and risk factor modification.  abnormal ECG, repeat echo this year Carotid atherosclerosis, repeat carotid US this year follow up after testing. Cardiac testing was done today which included echocardiogram and carotid ultrasound.  Echocardiogram reveals normal ejection fraction.  No pericardial effusion.  No LVH.  Minimal enlargement of left atrium.  Carotid ultrasound reveals mild bilateral carotid atherosclerosis.  No significant changes.  Patient is on aspirin and statin.  Her lipid profile is excellent.  She is physically active.  She walks on a regular basis.  No symptoms of any chest pains or shortness of breath.  Continue primary prevention. She has chronic lymphedema on left lower extremity.  She follows in [facility] for treatment.  Otherwise she is very stable. Cardiac follow-up yearly and as needed.

## 2024-01-23 ENCOUNTER — RX RENEWAL (OUTPATIENT)
Age: 79
End: 2024-01-23

## 2024-01-23 RX ORDER — OMEPRAZOLE 20 MG/1
20 CAPSULE, DELAYED RELEASE ORAL
Qty: 90 | Refills: 3 | Status: ACTIVE | COMMUNITY
Start: 2024-01-23 | End: 1900-01-01

## 2024-09-24 ENCOUNTER — NON-APPOINTMENT (OUTPATIENT)
Age: 79
End: 2024-09-24

## 2024-09-24 ENCOUNTER — APPOINTMENT (OUTPATIENT)
Dept: CARDIOLOGY | Facility: CLINIC | Age: 79
End: 2024-09-24
Payer: MEDICARE

## 2024-09-24 VITALS
HEART RATE: 68 BPM | WEIGHT: 293 LBS | HEIGHT: 60 IN | SYSTOLIC BLOOD PRESSURE: 118 MMHG | OXYGEN SATURATION: 99 % | BODY MASS INDEX: 57.52 KG/M2 | DIASTOLIC BLOOD PRESSURE: 58 MMHG

## 2024-09-24 DIAGNOSIS — I49.9 CARDIAC ARRHYTHMIA, UNSPECIFIED: ICD-10-CM

## 2024-09-24 DIAGNOSIS — R94.31 ABNORMAL ELECTROCARDIOGRAM [ECG] [EKG]: ICD-10-CM

## 2024-09-24 DIAGNOSIS — E78.5 HYPERLIPIDEMIA, UNSPECIFIED: ICD-10-CM

## 2024-09-24 DIAGNOSIS — I10 ESSENTIAL (PRIMARY) HYPERTENSION: ICD-10-CM

## 2024-09-24 PROCEDURE — 99214 OFFICE O/P EST MOD 30 MIN: CPT

## 2024-09-24 PROCEDURE — 93000 ELECTROCARDIOGRAM COMPLETE: CPT

## 2024-09-24 PROCEDURE — G2211 COMPLEX E/M VISIT ADD ON: CPT

## 2024-09-24 RX ORDER — UBIDECARENONE/VIT E ACET 100MG-5
CAPSULE ORAL
Refills: 0 | Status: ACTIVE | COMMUNITY

## 2024-09-24 RX ORDER — BACILLUS COAGULANS/INULIN 1B-250 MG
CAPSULE ORAL
Refills: 0 | Status: ACTIVE | COMMUNITY

## 2024-09-24 RX ORDER — CRANBERRY FRUIT EXTRACT 200 MG
CAPSULE ORAL
Refills: 0 | Status: ACTIVE | COMMUNITY

## 2024-09-24 NOTE — REASON FOR VISIT
[FreeTextEntry1] : COREY DON  is a 79 year F  who presents today in clinical follow-up.  Medical history mainly significant for Essential hypertension. Dyslipidemia. Obesity. Left lower extremity lymphedema. Her cardiologist retired.  She is going to see me going forward.  Recently lost her . Overall she is feeling well. No recent illness or hospital stay.  She has remained asymptomatic from a cardiovascular standpoint.  Denies change in her current medication regimen.   Today she denies chest pain, pressure, unusual shortness of breath, lightheadedness, dizziness, near syncope or syncope.  Cardiovascular history is significant for hypertension, overweight, abnormal ekg, age and family h/o CAD (multiple family members).   She denies any h/o MI, CAD, CHF, CVA, TIA or arrhythmia.

## 2024-09-24 NOTE — PHYSICAL EXAM
[Well Developed] : well developed [Obese] : obese [Normal Venous Pressure] : normal venous pressure [Normal S1, S2] : normal S1, S2 [No Rub] : no rub [Murmur] : murmur [Clear Lung Fields] : clear lung fields [Abnormal Gait] : abnormal gait [Edema ___] : edema [unfilled] [Venous stasis] : venous stasis [Normal Speech] : normal speech [Alert and Oriented] : alert and oriented

## 2024-09-24 NOTE — REVIEW OF SYSTEMS
[Joint Pain] : joint pain [Joint Stiffness] : joint stiffness [Under Stress] : under stress [Negative] : Neurological [FreeTextEntry5] : as per hpi  [de-identified] : as per hpi

## 2024-09-24 NOTE — DISCUSSION/SUMMARY
[FreeTextEntry1] : COREY DON  is a 79 year F  who presents today  with the above history and the following active issues.   -CAD risk factors age, HTN, dyslipidemia, strong family h/o CAD, overweight and abnormal ekg. Echocardiogram demonstrates normal resting cardiac structure and function.  Stress echocardiogram was non-diagnostic due to submaximal heart rate.  Myocardial perfusion imaging negative for ischemia or infarct.  Patient is asymptomatic from an arrhythmia standpoint.  -irregular heart beat for years. Avoid stimulants. Holter monitor- No sustained arrhythmias. If symptoms increase in frequency, duration or intensity consider event monitor.  She reports no further palpitations.  She increased hydration.  She cut down on caffeine intake.  - Hyperlipidemia, tolerating Crestor 5 mg. Patient should follow a low trans sat fat diet.  Excellent cholesterol panel. Lifestyle and risk factor modification.  -Essential hypertension.  Very well-controlled.  Continue amlodipine, Toprol, valsartan.  Goal less than 130/80.  Lifestyle modifications reviewed.  -Lymphedema.  Lymphedema clinic at [facility] or consider intermittent compression devices at home.  Counseling regarding low saturated fat,salt and carbohydrate intake was reviewed. Active lifestyle and regular exercise  along with weight management is advised. I have reviewed above at length. I answered all the questions. Patient verbalized understandings. Thank you very much for allowing me to participate in your patient's care. Please feel free to call me for any questions.  Sincerely,  Yasir Arreaga MD, FACC, WANDA  [EKG obtained to assist in diagnosis and management of assessed problem(s)] : EKG obtained to assist in diagnosis and management of assessed problem(s)

## 2024-09-24 NOTE — CARDIOLOGY SUMMARY
[de-identified] : September 24, 2024. EKG shows normal sinus rhythm.  Poor R wave progression. [de-identified] : Carotid duplex 4/9/19 non-obstructive diseas Abd CT 10/19 no aneurysm of the abdominal aorta Myocardial perfusion imaging 4/4/18 performed with Lexiscan, negative for ischemia or infarct  lab work 3/10/18 Alkaline phosphatase 65, ALT 9, AST 18, triglycerides 92, HDL 43, LDL 42, total cholesterol 103, CK 44. Echocardiogram February 21, 2018, ejection fraction 60%, mild mitral regurgitation, no segmental wall motion abnormalities. Stress echocardiogram March 8, 2018 patient completed 4 minutes 30 seconds of the protocol.  Submaximal rate noted.  Peak blood pressure 130/60. achieved 71% of MPHR  24hr Holter monitor - no sustained arrhythmias, rare PVC's, min HR 49bpm, max HR 85bpm, average HR 58bpm Labs: 11-2-17 wbc 7.1, h/h 13.0/39.54, plat 294, NA+ 142, K 3.8, bun/crea t22/0.85, AST 19, ALT 13, glucose 115, hgba1c 5.8, globulin 4.1, LDL 97, triglycerides 99, HDL 48, TSH 1.30

## 2024-09-30 ENCOUNTER — NON-APPOINTMENT (OUTPATIENT)
Age: 79
End: 2024-09-30

## 2025-03-05 PROBLEM — Z86.39 HISTORY OF THYROID NODULE: Status: RESOLVED | Noted: 2018-02-20 | Resolved: 2025-03-05

## 2025-03-06 ENCOUNTER — APPOINTMENT (OUTPATIENT)
Dept: CARDIOLOGY | Facility: CLINIC | Age: 80
End: 2025-03-06
Payer: MEDICARE

## 2025-03-06 ENCOUNTER — RX RENEWAL (OUTPATIENT)
Age: 80
End: 2025-03-06

## 2025-03-06 DIAGNOSIS — E78.5 HYPERLIPIDEMIA, UNSPECIFIED: ICD-10-CM

## 2025-03-06 DIAGNOSIS — I10 ESSENTIAL (PRIMARY) HYPERTENSION: ICD-10-CM

## 2025-03-06 DIAGNOSIS — E87.6 HYPOKALEMIA: ICD-10-CM

## 2025-03-06 DIAGNOSIS — Z85.3 PERSONAL HISTORY OF MALIGNANT NEOPLASM OF BREAST: ICD-10-CM

## 2025-03-06 DIAGNOSIS — E66.3 OVERWEIGHT: ICD-10-CM

## 2025-03-06 DIAGNOSIS — K21.00 GASTRO-ESOPHAGEAL REFLUX DISEASE WITH ESOPHAGITIS, WITHOUT BLEEDING: ICD-10-CM

## 2025-03-06 DIAGNOSIS — K21.9 DIAPHRAGMATIC HERNIA W/OUT OBSTRUCTION OR GANGRENE: ICD-10-CM

## 2025-03-06 DIAGNOSIS — Z00.00 ENCOUNTER FOR GENERAL ADULT MEDICAL EXAMINATION W/OUT ABNORMAL FINDINGS: ICD-10-CM

## 2025-03-06 DIAGNOSIS — Z86.39 PERSONAL HISTORY OF OTHER ENDOCRINE, NUTRITIONAL AND METABOLIC DISEASE: ICD-10-CM

## 2025-03-06 DIAGNOSIS — K44.9 DIAPHRAGMATIC HERNIA W/OUT OBSTRUCTION OR GANGRENE: ICD-10-CM

## 2025-03-06 DIAGNOSIS — R00.2 PALPITATIONS: ICD-10-CM

## 2025-03-06 PROCEDURE — 93242 EXT ECG>48HR<7D RECORDING: CPT

## 2025-03-06 PROCEDURE — 99024 POSTOP FOLLOW-UP VISIT: CPT

## 2025-03-06 RX ORDER — POTASSIUM CHLORIDE 600 MG/1
8 CAPSULE, EXTENDED RELEASE ORAL 3 TIMES DAILY
Qty: 270 | Refills: 0 | Status: ACTIVE | COMMUNITY
Start: 2025-03-06 | End: 1900-01-01

## 2025-03-21 PROCEDURE — 93244 EXT ECG>48HR<7D REV&INTERPJ: CPT
